# Patient Record
Sex: FEMALE | Race: WHITE | NOT HISPANIC OR LATINO | Employment: PART TIME | URBAN - METROPOLITAN AREA
[De-identification: names, ages, dates, MRNs, and addresses within clinical notes are randomized per-mention and may not be internally consistent; named-entity substitution may affect disease eponyms.]

---

## 2020-07-06 ENCOUNTER — OFFICE VISIT (OUTPATIENT)
Dept: INTERNAL MEDICINE CLINIC | Facility: CLINIC | Age: 65
End: 2020-07-06
Payer: COMMERCIAL

## 2020-07-06 VITALS
HEIGHT: 67 IN | RESPIRATION RATE: 18 BRPM | BODY MASS INDEX: 23.86 KG/M2 | OXYGEN SATURATION: 97 % | TEMPERATURE: 98.2 F | WEIGHT: 152 LBS | SYSTOLIC BLOOD PRESSURE: 134 MMHG | HEART RATE: 90 BPM | DIASTOLIC BLOOD PRESSURE: 86 MMHG

## 2020-07-06 DIAGNOSIS — Z13.31 DEPRESSION SCREENING NEGATIVE: ICD-10-CM

## 2020-07-06 DIAGNOSIS — Z12.39 SCREENING FOR BREAST CANCER: ICD-10-CM

## 2020-07-06 DIAGNOSIS — Z12.11 SCREEN FOR COLON CANCER: ICD-10-CM

## 2020-07-06 DIAGNOSIS — Z12.4 SCREENING FOR CERVICAL CANCER: ICD-10-CM

## 2020-07-06 DIAGNOSIS — R53.83 OTHER FATIGUE: ICD-10-CM

## 2020-07-06 DIAGNOSIS — Z13.1 SCREENING FOR DIABETES MELLITUS (DM): ICD-10-CM

## 2020-07-06 DIAGNOSIS — E55.9 VITAMIN D DEFICIENCY: ICD-10-CM

## 2020-07-06 DIAGNOSIS — Z11.59 ENCOUNTER FOR HEPATITIS C SCREENING TEST FOR LOW RISK PATIENT: ICD-10-CM

## 2020-07-06 DIAGNOSIS — L40.9 PSORIASIS: ICD-10-CM

## 2020-07-06 DIAGNOSIS — Z00.00 ENCOUNTER FOR PREVENTATIVE ADULT HEALTH CARE EXAMINATION: Primary | ICD-10-CM

## 2020-07-06 DIAGNOSIS — Z11.4 SCREENING FOR HIV (HUMAN IMMUNODEFICIENCY VIRUS): ICD-10-CM

## 2020-07-06 DIAGNOSIS — F17.200 CURRENT SMOKER: ICD-10-CM

## 2020-07-06 DIAGNOSIS — Z13.220 SCREENING FOR HYPERLIPIDEMIA: ICD-10-CM

## 2020-07-06 PROCEDURE — 99205 OFFICE O/P NEW HI 60 MIN: CPT | Performed by: NURSE PRACTITIONER

## 2020-07-06 PROCEDURE — 3008F BODY MASS INDEX DOCD: CPT | Performed by: NURSE PRACTITIONER

## 2020-07-06 NOTE — PROGRESS NOTES
Assessment/Plan:    No problem-specific Assessment & Plan notes found for this encounter  Diagnoses and all orders for this visit:    Encounter for preventative adult health care examination  Comments:  completed today    Vitamin D deficiency  Comments:  labwork ordered  Orders:  -     Vitamin D 25 hydroxy; Future    Other fatigue  Comments:  labwork ordered  Orders:  -     Lyme Antibody Profile with reflex to WB; Future  -     CBC and differential; Future  -     TSH, 3rd generation with Free T4 reflex; Future    Screening for breast cancer  Comments:  mammogram ordered  Orders:  -     Mammo screening bilateral w 3d & cad; Future    Screening for cervical cancer  Comments:  referred to gyn    Orders:  -     Ambulatory referral to Gynecology; Future    Depression screening negative    Screening for hyperlipidemia  Comments:  labwork ordered  Orders:  -     Lipid panel; Future    Screen for colon cancer  Comments:  cologuard ordered  Orders:  -     Cologuard; Future    Screening for diabetes mellitus (DM)  Comments:  labwork ordered  Orders:  -     Hemoglobin A1C; Future  -     Comprehensive metabolic panel    Encounter for hepatitis C screening test for low risk patient  Comments:  labwork ordered  Orders:  -     Hepatitis C antibody; Future    Screening for HIV (human immunodeficiency virus)  Comments:  labwork ordered  Orders:  -     HIV 1/2 Antigen/Antibody (4th Generation) w Reflex [facility]; Future    Current smoker  Comments:  pt not ready to quit smoking    BMI 24 0-24 9, adult    Psoriasis  Comments:  will await lab results  Orders:  -     JOYCE Scr, IFA W/Refl Titer/Pattern/Lupus Pnl 3; Future  -     RF Screen w/ Reflex to Titer; Future  -     Sedimentation rate, automated; Future  -     Sjogren's Antibodies; Future  -     C-reactive protein; Future          Subjective:      Patient ID: Rosa Maria Abad is a 60 y o  female  Est care    60 yr old female here with her daughter to est care   Pt states that she has not seen a Dr in over 30 yrs  Pt smokes 1plus ppd 48 years, has FH of Diabetes, notes generalized body rash patchy, urticaria appears to be psoriasis but will order labs to determine if pt has DM as well  Pt offers no other complaints today      The following portions of the patient's history were reviewed and updated as appropriate: She  has a past medical history of Depression  She   Patient Active Problem List    Diagnosis Date Noted    Depression screening negative 07/06/2020    Current smoker 07/06/2020    BMI 24 0-24 9, adult 07/06/2020     She  has no past surgical history on file  Her family history includes Diabetes in her father and mother; Heart disease in her father  She  reports that she has been smoking  She has never used smokeless tobacco  She reports that she drank alcohol  She reports that she has current or past drug history  Drug: Marijuana  No current outpatient medications on file  No current facility-administered medications for this visit  No current outpatient medications on file prior to visit  No current facility-administered medications on file prior to visit  She has No Known Allergies       Review of Systems   Constitutional: Negative  HENT: Negative  Eyes: Negative  Respiratory: Negative  Cardiovascular: Negative  Gastrointestinal: Negative  Endocrine: Negative  Genitourinary: Negative  Musculoskeletal: Negative  Skin: Positive for color change and rash  Patchy itching rash over legs, arms, and back   Allergic/Immunologic: Negative  Neurological: Negative  Hematological: Negative  Psychiatric/Behavioral: Negative  BMI Counseling: Body mass index is 24 17 kg/m²  Discussed the patient's BMI with her   The BMI is normal    PHQ-9 Depression Screening    PHQ-9:    Frequency of the following problems over the past two weeks:       Little interest or pleasure in doing things:  0 - not at all  Feeling down, depressed, or hopeless:  0 - not at all  PHQ-2 Score:  0        Depression Screening Follow-up Plan: Patient's depression screening was negative with a PHQ-2 score of 0    Clinically patient does not have depression  No treatment is required  Objective:      /86   Pulse 90   Temp 98 2 °F (36 8 °C) (Tympanic)   Resp 18   Ht 5' 6 5" (1 689 m)   Wt 68 9 kg (152 lb)   SpO2 97%   BMI 24 17 kg/m²          Physical Exam   Constitutional: She is oriented to person, place, and time  She appears well-developed and well-nourished  HENT:   Head: Normocephalic  Eyes: Pupils are equal, round, and reactive to light  Neck: Normal range of motion  Cardiovascular: Normal rate and regular rhythm  Pulmonary/Chest: Effort normal and breath sounds normal    Abdominal: Soft  Bowel sounds are normal    Musculoskeletal: Normal range of motion  Neurological: She is alert and oriented to person, place, and time  Skin: Skin is warm and dry  Lesion and rash noted  There is erythema  Psoriasis like patchy scaly rash over pts upper and lower trunk   Psychiatric: She has a normal mood and affect  Her behavior is normal  Judgment and thought content normal    Nursing note and vitals reviewed

## 2021-04-09 ENCOUNTER — IMMUNIZATIONS (OUTPATIENT)
Dept: FAMILY MEDICINE CLINIC | Facility: HOSPITAL | Age: 66
End: 2021-04-09

## 2021-04-09 PROCEDURE — 91303: CPT

## 2021-04-09 PROCEDURE — 0031A: CPT

## 2022-04-06 ENCOUNTER — VBI (OUTPATIENT)
Dept: ADMINISTRATIVE | Facility: OTHER | Age: 67
End: 2022-04-06

## 2022-08-23 ENCOUNTER — VBI (OUTPATIENT)
Dept: ADMINISTRATIVE | Facility: OTHER | Age: 67
End: 2022-08-23